# Patient Record
Sex: MALE | Race: OTHER | URBAN - METROPOLITAN AREA
[De-identification: names, ages, dates, MRNs, and addresses within clinical notes are randomized per-mention and may not be internally consistent; named-entity substitution may affect disease eponyms.]

---

## 2023-02-09 ENCOUNTER — OFFICE VISIT (OUTPATIENT)
Dept: URGENT CARE | Age: 48
End: 2023-02-09

## 2023-02-09 VITALS
WEIGHT: 185.6 LBS | HEIGHT: 67 IN | RESPIRATION RATE: 16 BRPM | OXYGEN SATURATION: 98 % | TEMPERATURE: 98.3 F | SYSTOLIC BLOOD PRESSURE: 129 MMHG | HEART RATE: 100 BPM | DIASTOLIC BLOOD PRESSURE: 89 MMHG | BODY MASS INDEX: 29.13 KG/M2

## 2023-02-09 DIAGNOSIS — R03.0 ELEVATED BP WITHOUT DIAGNOSIS OF HYPERTENSION: ICD-10-CM

## 2023-02-09 DIAGNOSIS — K21.9 CHRONIC GERD: ICD-10-CM

## 2023-02-09 DIAGNOSIS — H65.192 OTHER NON-RECURRENT ACUTE NONSUPPURATIVE OTITIS MEDIA OF LEFT EAR: Primary | ICD-10-CM

## 2023-02-09 PROBLEM — A04.8 H. PYLORI INFECTION: Status: ACTIVE | Noted: 2023-02-09

## 2023-02-09 RX ORDER — AMOXICILLIN 875 MG/1
875 TABLET, COATED ORAL 2 TIMES DAILY
Qty: 14 TABLET | Refills: 0 | Status: SHIPPED | OUTPATIENT
Start: 2023-02-09 | End: 2023-02-16

## 2023-02-09 RX ORDER — PANTOPRAZOLE SODIUM 40 MG/1
40 TABLET, DELAYED RELEASE ORAL
Qty: 60 TABLET | Refills: 0 | Status: SHIPPED | OUTPATIENT
Start: 2023-02-09

## 2023-02-09 RX ORDER — OMEPRAZOLE 40 MG/1
CAPSULE, DELAYED RELEASE ORAL
COMMUNITY
Start: 2022-12-07

## 2023-02-09 ASSESSMENT — ENCOUNTER SYMPTOMS
COUGH: 1
VOMITING: 0
NAUSEA: 1
BLOOD IN STOOL: 0
SORE THROAT: 1
SHORTNESS OF BREATH: 0
ABDOMINAL PAIN: 1
CONSTIPATION: 0
DIARRHEA: 0

## 2023-02-09 NOTE — PATIENT INSTRUCTIONS
Complete antibiotics as prescribed. GI referral due to chronic GERD and reported history of H. Pylori  Monitor BP at home and call PCP if persistently elevated.    Establish care with PCP ASAP (referral placed)  ER evaluation if symptoms worsen   New Prescriptions    AMOXICILLIN (AMOXIL) 875 MG TABLET    Take 1 tablet by mouth 2 times daily for 7 days    PANTOPRAZOLE (PROTONIX) 40 MG TABLET    Take 1 tablet by mouth 2 times daily (before meals)

## 2023-02-09 NOTE — PROGRESS NOTES
[address] (:  1975) is a 49 y.o. male,New patient, here for evaluation of the following chief complaint(s):  Dizziness, Ear Fullness, Abdominal Pain (In 2019 had H. Pylori and treated with medication, feels the same ), Heartburn (Omeprazole no longer helping ), and Pharyngitis      ASSESSMENT/PLAN:    ICD-10-CM    1. Other non-recurrent acute nonsuppurative otitis media of left ear  H65.192 amoxicillin (AMOXIL) 875 MG tablet      2. Elevated BP without diagnosis of hypertension  R03.0 [facility], DO [place], [facility]      3. Chronic GERD  K21.9 pantoprazole (PROTONIX) 40 MG tablet     ZAFAR - Juve Mosquera MD, Gastroenterology, [facility]        Complete antibiotics as prescribed. GI referral due to chronic GERD and reported history of H. Pylori  Monitor BP at home and call PCP if persistently elevated. Establish care with PCP ASAP (referral placed)  ER evaluation if symptoms worsen     SUBJECTIVE/OBJECTIVE:    History provided by:  Patient   used: Yes (Vietnamese)    HPI:   49 y.o. male presents with symptoms of abd pain, heartburn, and dizziness ongoing since 2022. Also has been dizzy and has \"buzzing in ears\" for \"20 days\". Has a constant sore throat ongoing for \"always\" and tongue pain. Was treated in [facility] in 2022 and 2022 for \"strep\" with amoxicillin, omeprazole, and \"another antibiotic that I don't remember\". Denies fever. Has taken omeprazole and raisa for symptoms with temporary relief. States he is eating and drinking normally. Vitals:    23 1232 23 1237   BP: (!) 132/91 129/89   Site: Right Upper Arm    Position: Sitting    Cuff Size: Large Adult    Pulse: 100    Resp: 16    Temp: 98.3 °F (36.8 °C)    SpO2: 98%    Weight: 185 lb 9.6 oz (84.2 kg)    Height: 5' 6.93\" (1.7 m)        Review of Systems   Constitutional:  Negative for fever.    HENT:  Positive for ear pain (buzzing in both ears) and sore throat (due to acid reflux; states tongue hurts). Respiratory:  Positive for cough (occasional cough, nonproductive). Negative for shortness of breath. Gastrointestinal:  Positive for abdominal pain (intermittent, mid, upper, sharp sometimes, returns 1-2 hours after eating) and nausea. Negative for blood in stool, constipation, diarrhea and vomiting. Neurological:  Positive for dizziness. All other systems reviewed and are negative. Physical Exam  Vitals reviewed. Constitutional:       Appearance: Normal appearance. HENT:      Head: Normocephalic. Right Ear: Tympanic membrane, ear canal and external ear normal.      Left Ear: Tympanic membrane, ear canal and external ear normal.      Nose: Nose normal.      Mouth/Throat:      Lips: Pink. Mouth: Mucous membranes are moist.      Dentition: No dental caries or gum lesions. Tongue: No lesions. Pharynx: Oropharynx is clear. Uvula midline. No pharyngeal swelling, oropharyngeal exudate or posterior oropharyngeal erythema. Tonsils: No tonsillar exudate or tonsillar abscesses. Eyes:      Pupils: Pupils are equal, round, and reactive to light. Cardiovascular:      Rate and Rhythm: Normal rate and regular rhythm. Heart sounds: Normal heart sounds. Pulmonary:      Breath sounds: Normal breath sounds. Abdominal:      General: Abdomen is flat. Bowel sounds are normal. There is no distension. Palpations: Abdomen is soft. There is no mass. Tenderness: There is no abdominal tenderness. There is no guarding or rebound. Hernia: No hernia is present. Musculoskeletal:      Cervical back: Normal range of motion and neck supple. Neurological:      Mental Status: He is alert and oriented to person, place, and time. Psychiatric:         Mood and Affect: Mood normal.         Behavior: Behavior normal.         An electronic signature was used to authenticate this note.     --Charito Chavira, APRN - CNP

## 2023-03-08 DIAGNOSIS — K21.9 CHRONIC GERD: ICD-10-CM

## 2023-03-08 RX ORDER — PANTOPRAZOLE SODIUM 40 MG/1
TABLET, DELAYED RELEASE ORAL
Qty: 60 TABLET | Refills: 0 | OUTPATIENT
Start: 2023-03-08